# Patient Record
Sex: MALE | Race: WHITE | Employment: OTHER | URBAN - METROPOLITAN AREA
[De-identification: names, ages, dates, MRNs, and addresses within clinical notes are randomized per-mention and may not be internally consistent; named-entity substitution may affect disease eponyms.]

---

## 2017-10-24 RX ORDER — CALCIUM CARBONATE 500(1250)
1 TABLET ORAL DAILY
COMMUNITY

## 2017-10-24 RX ORDER — SODIUM PHOSPHATE,MONO-DIBASIC 19G-7G/118
1 ENEMA (ML) RECTAL DAILY
COMMUNITY

## 2017-10-24 RX ORDER — FLUTICASONE PROPIONATE 50 MCG
2 SPRAY, SUSPENSION (ML) NASAL DAILY
COMMUNITY

## 2017-10-25 ENCOUNTER — ANESTHESIA (OUTPATIENT)
Dept: SURGERY | Facility: CLINIC | Age: 82
End: 2017-10-25
Payer: MEDICARE

## 2017-10-25 ENCOUNTER — HOSPITAL ENCOUNTER (OUTPATIENT)
Facility: CLINIC | Age: 82
Discharge: HOME | End: 2017-10-25
Attending: OPHTHALMOLOGY | Admitting: OPHTHALMOLOGY
Payer: MEDICARE

## 2017-10-25 ENCOUNTER — ANESTHESIA EVENT (OUTPATIENT)
Dept: SURGERY | Facility: CLINIC | Age: 82
End: 2017-10-25
Payer: MEDICARE

## 2017-10-25 ENCOUNTER — SURGERY (OUTPATIENT)
Age: 82
End: 2017-10-25

## 2017-10-25 VITALS
SYSTOLIC BLOOD PRESSURE: 104 MMHG | HEIGHT: 74 IN | WEIGHT: 186 LBS | BODY MASS INDEX: 23.87 KG/M2 | DIASTOLIC BLOOD PRESSURE: 79 MMHG | HEART RATE: 79 BPM | RESPIRATION RATE: 16 BRPM | OXYGEN SATURATION: 94 % | TEMPERATURE: 97 F

## 2017-10-25 PROCEDURE — 25000128 H RX IP 250 OP 636: Performed by: OPHTHALMOLOGY

## 2017-10-25 PROCEDURE — 36000101 ZZH EYE SURGERY LEVEL 3 1ST 30 MIN: Performed by: OPHTHALMOLOGY

## 2017-10-25 PROCEDURE — 27210794 ZZH OR GENERAL SUPPLY STERILE: Performed by: OPHTHALMOLOGY

## 2017-10-25 PROCEDURE — 71000028 ZZH EYE RECOVERY PHASE 2 EACH 15 MINS: Performed by: OPHTHALMOLOGY

## 2017-10-25 PROCEDURE — 25000125 ZZHC RX 250: Performed by: OPHTHALMOLOGY

## 2017-10-25 PROCEDURE — 25000128 H RX IP 250 OP 636: Performed by: NURSE ANESTHETIST, CERTIFIED REGISTERED

## 2017-10-25 PROCEDURE — 25000128 H RX IP 250 OP 636: Performed by: ANESTHESIOLOGY

## 2017-10-25 PROCEDURE — 37000009 ZZH ANESTHESIA TECHNICAL FEE, EACH ADDTL 15 MIN: Performed by: OPHTHALMOLOGY

## 2017-10-25 PROCEDURE — 25000125 ZZHC RX 250: Performed by: NURSE ANESTHETIST, CERTIFIED REGISTERED

## 2017-10-25 PROCEDURE — 36000102 ZZH EYE SURGERY LEVEL 3 EA 15 ADDTL MIN: Performed by: OPHTHALMOLOGY

## 2017-10-25 PROCEDURE — 25000125 ZZHC RX 250: Performed by: ANESTHESIOLOGY

## 2017-10-25 PROCEDURE — 37000008 ZZH ANESTHESIA TECHNICAL FEE, 1ST 30 MIN: Performed by: OPHTHALMOLOGY

## 2017-10-25 PROCEDURE — 40000170 ZZH STATISTIC PRE-PROCEDURE ASSESSMENT II: Performed by: OPHTHALMOLOGY

## 2017-10-25 RX ORDER — SODIUM CHLORIDE, SODIUM LACTATE, POTASSIUM CHLORIDE, CALCIUM CHLORIDE 600; 310; 30; 20 MG/100ML; MG/100ML; MG/100ML; MG/100ML
INJECTION, SOLUTION INTRAVENOUS CONTINUOUS
Status: DISCONTINUED | OUTPATIENT
Start: 2017-10-25 | End: 2017-10-25 | Stop reason: HOSPADM

## 2017-10-25 RX ORDER — ERYTHROMYCIN 5 MG/G
OINTMENT OPHTHALMIC PRN
Status: DISCONTINUED | OUTPATIENT
Start: 2017-10-25 | End: 2017-10-25 | Stop reason: HOSPADM

## 2017-10-25 RX ORDER — ONDANSETRON 2 MG/ML
INJECTION INTRAMUSCULAR; INTRAVENOUS PRN
Status: DISCONTINUED | OUTPATIENT
Start: 2017-10-25 | End: 2017-10-25

## 2017-10-25 RX ORDER — TETRACAINE HYDROCHLORIDE 5 MG/ML
SOLUTION OPHTHALMIC PRN
Status: DISCONTINUED | OUTPATIENT
Start: 2017-10-25 | End: 2017-10-25 | Stop reason: HOSPADM

## 2017-10-25 RX ORDER — PROPOFOL 10 MG/ML
INJECTION, EMULSION INTRAVENOUS PRN
Status: DISCONTINUED | OUTPATIENT
Start: 2017-10-25 | End: 2017-10-25

## 2017-10-25 RX ADMIN — LIDOCAINE HYDROCHLORIDE 1 ML: 10 INJECTION, SOLUTION EPIDURAL; INFILTRATION; INTRACAUDAL; PERINEURAL at 11:31

## 2017-10-25 RX ADMIN — PROPOFOL 10 MG: 10 INJECTION, EMULSION INTRAVENOUS at 12:26

## 2017-10-25 RX ADMIN — SODIUM CHLORIDE, POTASSIUM CHLORIDE, SODIUM LACTATE AND CALCIUM CHLORIDE: 600; 310; 30; 20 INJECTION, SOLUTION INTRAVENOUS at 11:31

## 2017-10-25 RX ADMIN — PROPOFOL 10 MG: 10 INJECTION, EMULSION INTRAVENOUS at 12:27

## 2017-10-25 RX ADMIN — LIDOCAINE HYDROCHLORIDE,EPINEPHRINE BITARTRATE 5 ML: 20; .01 INJECTION, SOLUTION INFILTRATION; PERINEURAL at 12:28

## 2017-10-25 RX ADMIN — ERYTHROMYCIN 1 G: 5 OINTMENT OPHTHALMIC at 12:41

## 2017-10-25 RX ADMIN — PHENYLEPHRINE HYDROCHLORIDE 100 MCG: 10 INJECTION INTRAVENOUS at 12:43

## 2017-10-25 RX ADMIN — ONDANSETRON 4 MG: 2 INJECTION INTRAMUSCULAR; INTRAVENOUS at 12:30

## 2017-10-25 RX ADMIN — PHENYLEPHRINE HYDROCHLORIDE 100 MCG: 10 INJECTION INTRAVENOUS at 12:46

## 2017-10-25 RX ADMIN — PROPOFOL 30 MG: 10 INJECTION, EMULSION INTRAVENOUS at 12:25

## 2017-10-25 RX ADMIN — DEXMEDETOMIDINE HYDROCHLORIDE 12 MCG: 100 INJECTION, SOLUTION INTRAVENOUS at 12:21

## 2017-10-25 RX ADMIN — TETRACAINE HYDROCHLORIDE 1 DROP: 5 SOLUTION OPHTHALMIC at 12:23

## 2017-10-25 ASSESSMENT — ENCOUNTER SYMPTOMS: DYSRHYTHMIAS: 1

## 2017-10-25 NOTE — IP AVS SNAPSHOT
[facility]    [address]    [place] [address]    Phone:  [phone]    Fax:  [phone]                                       After Visit Summary   10/25/2017    Donavon Jenkins    MRN: [id number]           After Visit Summary Signature Page     I have received my discharge instructions, and my questions have been answered. I have discussed any challenges I see with this plan with the nurse or doctor.    ..........................................................................................................................................  Patient/Patient Representative Signature      ..........................................................................................................................................  Patient Representative Print Name and Relationship to Patient    ..................................................               ................................................  Date                                            Time    ..........................................................................................................................................  Reviewed by Signature/Title    ...................................................              ..............................................  Date                                                            Time

## 2017-10-25 NOTE — BRIEF OP NOTE
[facility] Brief Operative Note    Pre-operative diagnosis: ICD H02.834;H02.831; H02.413; H02.423   Post-operative diagnosis * No post-op diagnosis entered *  Same   Procedure: Procedure(s):  BILATERAL UPPER LID BLEPHAROPLASTY; BILATERAL UPPER LID PTOSIS REPAIR; BILATERAL BROW PEXY  - Wound Class: I-Clean   - Wound Class: I-Clean   Surgeon(s): Surgeon(s) and Role:     * Joshua Carpenter MD - Primary   Estimated blood loss: 2 mL    Specimens: * No specimens in log *   Findings: See dictation

## 2017-10-25 NOTE — IP AVS SNAPSHOT
MRN:[id number]                      After Visit Summary   10/25/2017    Donavon Jenkins    MRN: [id number]           Thank you!     Thank you for choosing [place] for your care. Our goal is always to provide you with excellent care. Hearing back from our patients is one way we can continue to improve our services. Please take a few minutes to complete the written survey that you may receive in the mail after you visit with us. Thank you!        Patient Information     Date Of Birth          6/6/1933        About your hospital stay     You were admitted on:  October 25, 2017 You last received care in the:  [facility] [place]    You were discharged on:  October 25, 2017       Who to Call     For medical emergencies, please call 911.  For non-urgent questions about your medical care, please call your primary care provider or clinic, [phone]  For questions related to your surgery, please call your surgery clinic        Attending Provider     Provider Specialty    Joshua Carpenter MD Ophthalmology       Primary Care Provider Office Phone # Fax #    Gonzalez Harrell [phone]      After Care Instructions     Eye drops as prescribed by physician.  Start drops today unless told otherwise by the physician           May use Tylenol or Advil for pain as directed by the physician           Notify Physician if you have severe headache or nausea           Remove patch per physician instruction           Return to clinic as instructed by physician           Wear eye shield or patch as directed                 Further instructions from your care team       [facility] Discharge  Instructions  Anesthesia (Eye Care Center)   Adult Discharge Instructions    For 24 hours after surgery    1. Get plenty of rest.  Make arrangements to have a responsible adult stay with you for at least 6 hours after you leave the hospital.  2. Do not drive or use heavy  equipment for 24 hours.    3. Do not drink alcohol for 24 hours.  4. Do not sign legal documents or make important decisions for 24 hours.  5. Avoid strenuous or risky activities. You may feel lightheaded.  If so, sit for a few minutes before standing.  Have someone help you get up.   6. Conscious sedation patients may resume a regular diet..  7. Any questions of medical nature, call your physician.      POST-OPERATIVE CARE FOLLOWING OPHTHALMIC PLASTIC SURGERY AND DCR  DR. WILLIAM PRIEST        ICE PACKS:                                                                                                             Have plenty of ice on hand at home for keeping ice packs over the surgical area. You can make an ice pack by putting some ice in a zip-lock baggie, and wrapping that with a clean washcloth. Apply ice as much as you can for 48 hours after surgery taking breaks as needed. Use you ice packs while in a reclined position, with two pillows under you head. The ice packs are very important, as cold helps reduce swelling and bruising. Sleeping with two pillows under your head the night of surgery may also minimize swelling.  Start hot packs 2 days after surgery.      BANDAGES AND OINTMENTS:                                                                         Please leave your bandages in place for 24 hours unless otherwise instructed. After removing the bandages, apply the ERYTHROMYCIN OINTMENT to the surgical area (along the sutures if present) FOUR TIMES DAILY FOR 1 WEEK. If you get ointment in your eye, it will blur your vision slightly, but will not harm your eye.      ACTIVITIES:                                                                                                             Strenuous activity should be avoided for the first week after surgery. For pain or discomfort, use TYLENOL as necessary following the directions on the bottle. Please DO NOT USE ANY ASPRIN PRODUCTS. In most cases you may shower or  "bathe the day following surgery. Avoid getting the operative area excessively wet; gently \"dab dry\", DO NOT RUB.      COMPLICATIONS:             Excessive pain, bleeding, or loss of vision is rare. Please notify us if any of these symptoms occur, we have an on-call service after hours, just call our main office number [phone].      Pending Results     No orders found from 10/23/2017 to 10/26/2017.            Admission Information     Date & Time Provider Department Dept. Phone    10/25/2017 Joshua Carpenter MD [facility] [place] [facility] [phone]      Your Vitals Were     Blood Pressure Pulse Temperature Respirations Height Weight    85/64 79 97  F (36.1  C) (Temporal) 16 1.88 m (6' 2\") 84.4 kg (186 lb)    Pulse Oximetry BMI (Body Mass Index)                95% 23.88 kg/m2          MyChart Information     [facility] lets you send messages to your doctor, view your test results, renew your prescriptions, schedule appointments and more. To sign up, go to [email or web address] . Click on \"Log in\" on the left side of the screen, which will take you to the Welcome page. Then click on \"Sign up Now\" on the right side of the page.     You will be asked to enter the access code listed below, as well as some personal information. Please follow the directions to create your username and password.     Your access code is: [email or web address]  Expires: 2018  1:10 PM     Your access code will  in 90 days. If you need help or a new code, please call your [place] clinic or [phone].        Care EveryWhere ID     This is your Care EveryWhere ID. This could be used by other organizations to access your [place] medical records  [id number]        Equal Access to Services     BARBIE DOMINGUEZ : Manish Romero, marcin wu, qashawn kaalgiancarlo abreu. So [facility] [phone].    ATENCIÓN: Si habla español, tiene a srinivasan disposición servicios gratuitos de " asistencia lingüística. Efren al [phone].    We comply with applicable federal civil rights laws and [place] laws. We do not discriminate on the basis of race, color, national origin, age, disability, sex, sexual orientation, or gender identity.               Review of your medicines      UNREVIEWED medicines. Ask your doctor about these medicines        Dose / Directions    budesonide 3 MG 24 hr capsule   Commonly known as:  ENTOCORT EC        Dose:  9 mg   Take 9 mg by mouth every morning   Refills:  0       calcium carbonate 1250 MG tablet   Commonly known as:  OS-AISHA 500 mg [place]. [place]        Dose:  1 tablet   Take 1 tablet by mouth daily   Refills:  0       fluticasone 50 MCG/ACT spray   Commonly known as:  FLONASE        Dose:  2 spray   Spray 2 sprays into both nostrils daily   Refills:  0       GLUCOSAMINE RELIEF PO        Take  by mouth.   Refills:  0       glucosamine-chondroitin 500-400 MG Caps per capsule        Dose:  1 capsule   Take 1 capsule by mouth daily   Refills:  0       IBUPROFEN PO        Refills:  0       metoprolol 25 MG tablet   Commonly known as:  LOPRESSOR   Used for:  Atrial fibrillation (H)        Dose:  25 mg   Take 1 tablet (25 mg) by mouth 2 times daily   Quantity:  60 tablet   Refills:  0       NITROSTAT SL        Dose:  0.4 mg   Place 0.4 mg under the tongue every 5 minutes as needed for chest pain   Refills:  0       OMEGA-3 FISH OIL PO        Refills:  0       oxyCODONE 5 MG IR tablet   Commonly known as:  ROXICODONE   Used for:  Spinal stenosis        Dose:  5-10 mg   Take 1-2 tablets (5-10 mg) by mouth every 3 hours as needed for moderate to severe pain   Quantity:  40 tablet   Refills:  0       polyethylene glycol Packet   Commonly known as:  MIRALAX/GLYCOLAX   Used for:  Constipation        Dose:  17 g   Take 17 g by mouth daily as needed for constipation (constipation)   Quantity:  7 packet   Refills:  0       PRADAXA PO        Dose:  150 mg   Take 150 mg by mouth 2  times daily   Refills:  0       PROPAFENONE HCL PO        Dose:  225 mg   Take 225 mg by mouth 2 times daily   Refills:  0       SIMVASTATIN PO        Dose:  20 mg   Take 20 mg by mouth At Bedtime   Refills:  0       VITAMIN C PO        Dose:  1000 mg   Take 1,000 mg by mouth 2 times daily   Refills:  0       VITAMIN D (CHOLECALCIFEROL) PO        Dose:  800 Units   Take 800 Units by mouth daily   Refills:  0       VITAMIN E NATURAL PO        Dose:  200 Units   Take 200 Units by mouth daily   Refills:  0       warfarin 5 MG tablet   Commonly known as:  COUMADIN   Used for:  Atrial fibrillation (H)        Dose:  5 mg   Take 1 tablet (5 mg) by mouth daily   Quantity:  30 tablet   Refills:  0                Protect others around you: Learn how to safely use, store and throw away your medicines at www.disposemymeds.org.             Medication List: This is a list of all your medications and when to take them. Check marks below indicate your daily home schedule. Keep this list as a reference.      Medications           Morning Afternoon Evening Bedtime As Needed    budesonide 3 MG 24 hr capsule   Commonly known as:  ENTOCORT EC   Take 9 mg by mouth every morning                                calcium carbonate 1250 MG tablet   Commonly known as:  OS-AISHA 500 mg [place]. Ca   Take 1 tablet by mouth daily                                fluticasone 50 MCG/ACT spray   Commonly known as:  FLONASE   Spray 2 sprays into both nostrils daily                                GLUCOSAMINE RELIEF PO   Take  by mouth.                                glucosamine-chondroitin 500-400 MG Caps per capsule   Take 1 capsule by mouth daily                                IBUPROFEN PO                                metoprolol 25 MG tablet   Commonly known as:  LOPRESSOR   Take 1 tablet (25 mg) by mouth 2 times daily                                NITROSTAT SL   Place 0.4 mg under the tongue every 5 minutes as needed for chest pain                                 OMEGA-3 FISH OIL PO                                oxyCODONE 5 MG IR tablet   Commonly known as:  ROXICODONE   Take 1-2 tablets (5-10 mg) by mouth every 3 hours as needed for moderate to severe pain                                polyethylene glycol Packet   Commonly known as:  MIRALAX/GLYCOLAX   Take 17 g by mouth daily as needed for constipation (constipation)                                PRADAXA PO   Take 150 mg by mouth 2 times daily                                PROPAFENONE HCL PO   Take 225 mg by mouth 2 times daily                                SIMVASTATIN PO   Take 20 mg by mouth At Bedtime                                VITAMIN C PO   Take 1,000 mg by mouth 2 times daily                                VITAMIN D (CHOLECALCIFEROL) PO   Take 800 Units by mouth daily                                VITAMIN E NATURAL PO   Take 200 Units by mouth daily                                warfarin 5 MG tablet   Commonly known as:  COUMADIN   Take 1 tablet (5 mg) by mouth daily

## 2017-10-25 NOTE — OP NOTE
Pre-op Diagnosis:  1.  Bilateral upper lid dermatochalasis 2. Bilateral upper lid ptosis 3. Bilateral brow ptosis 4. Bilateral superior visual field defect    Post-operative Diagnosis:  Same    Procedure:  1.  Bilateral upper lid blepharoplasty  2.  Bilateral upper lid ptosis repair 3.  Bilateral browpexy    Surgeon:  Joshua Carpenter MD    Anesthesia:  MAC, 2% Lidocaine with 1:100,000 epinephrine and sodium bicarbonate    Complications:  None    EBL:  5cc    Indications for surgery:  The patient presented to the office with a history of decreased superior visual field secondary to dermatochalasis, ptosis, and brow ptosis, documented with measurements, photos, and visual field testing.  We discussed the risks, benefits, and alternatives of the aforementioned procedures and the patient wished to proceed.      Procedure:  The upper eyelid creases were marked using a skin marking pen.  A blepharoplasty incision was marked out on each upper eyelid allowing for adequate skin removal.  Both upper lids were infiltrated with the local anesthetic.  Beginning on the left side, the skin was incised using a scalpel, and then a skin-muscle flap was removed using scissors.  The orbital septum was opened and a small amount of preaponeurotic fat was removed and sculpted for better lid contour.  The levator aponeurosis was identified and disinserted from the anterior surface of the tarsus.  The high temperature cautery was used to dissect the retro orbicularis oculi fat pad from underneath the brow until good brow motility was noted.  An identical dissection was carried out on the right side.  A 5-0 double armed mersilene was used to plicate the levator to the anterior surface of the tarsus on each side.  The patient was asked to open the eyes and small adjustments were made until good lid contour and height were achieved on each side.  The lid position was noted to be symmetric at the end of making adjustments.  A 4-0 prolene suture  was used to plicate the brow internally to the frontalis periosteum.  Good brow height and symmetry were noted on each side.  A 6-0 fast absorbing plain gut suture was used to reform the crease by capturing the skin, levator, and skin again.  Symmetry was achieved on each side.  The incisions were closed using 6-0 fast absorbing gut suture.  Erythromycin ointment was placed on the incisions, followed by a bandage.  All hemostasis was achieved with the high temperature cautery and the patient tolerated the procedure well.

## 2017-10-25 NOTE — ANESTHESIA PREPROCEDURE EVALUATION
Anesthesia Evaluation     . Pt has had prior anesthetic.     No history of anesthetic complications          ROS/MED HX    ENT/Pulmonary:     (+)sleep apnea, uses CPAP , . .    Neurologic:       Cardiovascular:     (+) Dyslipidemia, hypertension----. : . . . :. dysrhythmias a-fib, .       METS/Exercise Tolerance:     Hematologic:         Musculoskeletal:         GI/Hepatic:        (-) GERD and liver disease   Renal/Genitourinary:     (+) chronic renal disease, type: CRI,       Endo:     (+) type II DM .      Psychiatric:         Infectious Disease:         Malignancy:         Other:                     Physical Exam  Normal systems: cardiovascular and pulmonary    Airway   Mallampati: II  TM distance: >3 FB  Neck ROM: full    Dental   (+) partials    Cardiovascular       Pulmonary                     Anesthesia Plan      History & Physical Review  History and physical reviewed and following examination; no interval change.    ASA Status:  3 .    NPO Status:  > 8 hours    Plan for MAC Reason for MAC:  Procedure to face, neck, head or breast         Postoperative Care      Consents  Anesthetic plan, risks, benefits and alternatives discussed with:  Patient..        Procedure: Procedure(s):  COMBINED BLEPHAROPLASTY, BROW LIFT BILATERAL  REPAIR PTOSIS BILATERAL  Preop diagnosis: ICD H02.834;H02.831; H02.413; H02.423    No Known Allergies  Past Medical History:   Diagnosis Date     Air conduction deafness      Atrial fibrillation (H)      Back pain      BPH (benign prostatic hypertrophy)      CKD (chronic kidney disease), stage III      Hypertension      Lymphocytic colitis      Noninfectious ileitis     colitis/chronic diarrhea     Other and unspecified hyperlipidemia      Repeated falls     fell twice in past 5 days/skin tears rt hand+ arm     Sleep apnea     uses CPAP     Past Surgical History:   Procedure Laterality Date     carotid enderactomy[       CATARACT IOL, RT/LT       COLONOSCOPY  2/8/2012     Procedure:COLONOSCOPY; COLONOSCOPY ; Surgeon:KRIS VALLEJO; Location: GI     [facility] OR OCULAR DEVICE INTRAOP DETACHED RETINA      right eye     LAMINECTOMY LUMBAR ONE LEVEL  7/14/2014    Procedure: LAMINECTOMY LUMBAR ONE LEVEL;  Surgeon: Pop Rogel MD;  Location:  OR     ORTHOPEDIC SURGERY      lower back     Prior to Admission medications    Medication Sig Start Date End Date Taking? Authorizing Provider   Dabigatran Etexilate Mesylate (PRADAXA PO) Take 150 mg by mouth 2 times daily   Yes Reported, Patient   fluticasone (FLONASE) 50 MCG/ACT spray Spray 2 sprays into both nostrils daily   Yes Reported, Patient   calcium carbonate (OS-AISHA 500 MG [place]. [place]) 1250 MG tablet Take 1 tablet by mouth daily   Yes Reported, Patient   Ascorbic Acid (VITAMIN C PO) Take 1,000 mg by mouth 2 times daily   Yes Reported, Patient   VITAMIN D, CHOLECALCIFEROL, PO Take 800 Units by mouth daily   Yes Reported, Patient   VITAMIN E NATURAL PO Take 200 Units by mouth daily   Yes Reported, Patient   glucosamine-chondroitin 500-400 MG CAPS per capsule Take 1 capsule by mouth daily   Yes Reported, Patient   metoprolol (LOPRESSOR) 25 MG tablet Take 1 tablet (25 mg) by mouth 2 times daily 7/17/14  Yes Papa Echeverria MD   Omega-3 Fatty Acids (OMEGA-3 FISH OIL PO)    Yes Reported, Patient   IBUPROFEN PO    Yes Reported, Patient   SIMVASTATIN PO Take 20 mg by mouth At Bedtime    Yes Reported, Patient   Glucosamine Sulfate (GLUCOSAMINE RELIEF PO) Take  by mouth.   Yes Reported, Patient   warfarin (COUMADIN) 5 MG tablet Take 1 tablet (5 mg) by mouth daily 7/23/14   Hillary Villavicencio APRN CNP   polyethylene glycol (MIRALAX/GLYCOLAX) packet Take 17 g by mouth daily as needed for constipation (constipation) 7/17/14   Papa Echeverria MD   oxyCODONE (ROXICODONE) 5 MG immediate release tablet Take 1-2 tablets (5-10 mg) by mouth every 3 hours as needed for moderate to severe pain 7/17/14   Cheri Lomeli, PAGiselleC   budesonide (ENTOCORT EC) 3 MG 24 hr  capsule Take 9 mg by mouth every morning    Reported, Patient   Nitroglycerin (NITROSTAT SL) Place 0.4 mg under the tongue every 5 minutes as needed for chest pain    Reported, Patient   PROPAFENONE HCL PO Take 225 mg by mouth 2 times daily     Reported, Patient     Current Facility-Administered Medications Ordered in Epic   Medication Dose Route Frequency Last Rate Last Dose     lidocaine 1 % 1 mL  1 mL Other Q1H PRN   1 mL at 10/25/17 1131     lactated ringers infusion   Intravenous Continuous 25 mL/hr at 10/25/17 1131       No current [facility]-ordered outpatient prescriptions on file.     Wt Readings from Last 1 Encounters:   10/25/17 84.4 kg (186 lb)     Temp Readings from Last 1 Encounters:   10/25/17 36.1  C (97  F) (Temporal)     BP Readings from Last 6 Encounters:   10/25/17 125/89   07/18/14 140/80   07/17/14 115/73   02/08/12 128/74     Pulse Readings from Last 4 Encounters:   10/25/17 79   07/18/14 78     Resp Readings from Last 1 Encounters:   10/25/17 18     SpO2 Readings from Last 1 Encounters:   10/25/17 97%     Recent Labs   Lab Test  07/17/14   0800  07/16/14   1815   NA  128*  126*   POTASSIUM  4.7  4.9   CHLORIDE  93*  89*   CO2  29  29   ANIONGAP  6  8   GLC  104*  108*   BUN  15  15   CR  0.88  0.86   AISHA  8.6  8.7     Recent Labs   Lab Test  07/17/14   0800  07/16/14   1309   WBC  7.2  11.2*   HGB  12.7*  13.3   PLT  230  238     Recent Labs   Lab Test  07/16/14   1309  07/14/14   0840   INR  1.11  1.07      RECENT LABS:   ECG:   ECHO:   CXR:                      .

## 2017-10-25 NOTE — DISCHARGE INSTRUCTIONS
[facility] Discharge  Instructions  Anesthesia ([facility] Care Center)   Adult Discharge Instructions    For 24 hours after surgery    1. Get plenty of rest.  Make arrangements to have a responsible adult stay with you for at least 6 hours after you leave the hospital.  2. Do not drive or use heavy equipment for 24 hours.    3. Do not drink alcohol for 24 hours.  4. Do not sign legal documents or make important decisions for 24 hours.  5. Avoid strenuous or risky activities. You may feel lightheaded.  If so, sit for a few minutes before standing.  Have someone help you get up.   6. Conscious sedation patients may resume a regular diet..  7. Any questions of medical nature, call your physician.      POST-OPERATIVE CARE FOLLOWING OPHTHALMIC PLASTIC SURGERY AND DCR  DR. WILLIAM PRIEST        ICE PACKS:                                                                                                             Have plenty of ice on hand at home for keeping ice packs over the surgical area. You can make an ice pack by putting some ice in a zip-lock baggie, and wrapping that with a clean washcloth. Apply ice as much as you can for 48 hours after surgery taking breaks as needed. Use you ice packs while in a reclined position, with two pillows under you head. The ice packs are very important, as cold helps reduce swelling and bruising. Sleeping with two pillows under your head the night of surgery may also minimize swelling.  Start hot packs 2 days after surgery.      BANDAGES AND OINTMENTS:                                                                         Please leave your bandages in place for 24 hours unless otherwise instructed. After removing the bandages, apply the ERYTHROMYCIN OINTMENT to the surgical area (along the sutures if present) FOUR TIMES DAILY FOR 1 WEEK. If you get ointment in your eye, it will blur your vision slightly, but will not harm your eye.      ACTIVITIES:             "                                                                                                 Strenuous activity should be avoided for the first week after surgery. For pain or discomfort, use TYLENOL as necessary following the directions on the bottle. Please DO NOT USE ANY ASPRIN PRODUCTS. In most cases you may shower or bathe the day following surgery. Avoid getting the operative area excessively wet; gently \"dab dry\", DO NOT RUB.      COMPLICATIONS:             Excessive pain, bleeding, or loss of vision is rare. Please notify us if any of these symptoms occur, we have an on-call service after hours, just call our main office number [phone].    "

## 2017-10-25 NOTE — ANESTHESIA CARE TRANSFER NOTE
Patient: Donavon Jenkins    Procedure(s):  BILATERAL UPPER LID BLEPHAROPLASTY; BILATERAL UPPER LID PTOSIS REPAIR; BILATERAL BROW PEXY  - Wound Class: I-Clean   - Wound Class: I-Clean    Diagnosis: ICD H02.834;H02.831; H02.413; H02.423  Diagnosis Additional Information: No value filed.    Anesthesia Type:   MAC     Note:  Airway :Room Air  Patient transferred to:PACU  Comments: Awake, alert, VSS, report to RN, monitors and alarms on, IV patent.  Handoff Report: Identifed the Patient, Identified the Reponsible Provider, Reviewed the pertinent medical history, Discussed the surgical course, Reviewed Intra-OP anesthesia mangement and issues during anesthesia, Set expectations for post-procedure period and Allowed opportunity for questions and acknowledgement of understanding      Vitals: (Last set prior to Anesthesia Care Transfer)    CRNA VITALS  10/25/2017 1227 - 10/25/2017 1303      10/25/2017             Pulse: 71    Ht Rate: 73    SpO2: 94 %    Resp Rate (set): 10                Electronically Signed By: MALLORY Moore CRNA  October 25, 2017  1:03 PM

## 2017-10-25 NOTE — ANESTHESIA POSTPROCEDURE EVALUATION
Patient: Donaovn Jenkins    Procedure(s):  BILATERAL UPPER LID BLEPHAROPLASTY; BILATERAL UPPER LID PTOSIS REPAIR; BILATERAL BROW PEXY  - Wound Class: I-Clean   - Wound Class: I-Clean    Diagnosis:ICD H02.834;H02.831; H02.413; H02.423  Diagnosis Additional Information: No value filed.    Anesthesia Type:  MAC    Note:  Anesthesia Post Evaluation    Patient location during evaluation: PACU  Patient participation: Able to fully participate in evaluation  Level of consciousness: awake and alert  Pain management: satisfactory to patient  Airway patency: patent  Cardiovascular status: hemodynamically stable  Respiratory status: acceptable and unassisted  Hydration status: balanced  PONV: none     Anesthetic complications: None          Last vitals:  Vitals:    10/25/17 1127 10/25/17 1306 10/25/17 1315   BP: 125/89 (!) 85/64 104/79   Pulse: 79     Resp: 18 16 16   Temp: 36.1  C (97  F)     SpO2: 97% 95% 94%         Electronically Signed By: Tyron Momin MD  October 25, 2017  1:58 PM

## 2017-10-25 NOTE — OR NURSING
Post op instructions reviewed with pt and responsible adult.  VSS, denies pain.  D/C'd to home with an ice pack.

## 2021-01-21 ENCOUNTER — HOSPITAL LABORATORY (OUTPATIENT)
Dept: OTHER | Facility: CLINIC | Age: 86
End: 2021-01-21

## 2021-01-21 LAB
ANION GAP SERPL CALCULATED.3IONS-SCNC: 3 MMOL/L (ref 3–14)
BUN SERPL-MCNC: 30 MG/DL (ref 7–30)
CALCIUM SERPL-MCNC: 9 MG/DL (ref 8.5–10.1)
CHLORIDE SERPL-SCNC: 101 MMOL/L (ref 94–109)
CO2 SERPL-SCNC: 31 MMOL/L (ref 20–32)
CREAT SERPL-MCNC: 1.17 MG/DL (ref 0.66–1.25)
GFR SERPL CREATININE-BSD FRML MDRD: 55 ML/MIN/{1.73_M2}
GLUCOSE SERPL-MCNC: 106 MG/DL (ref 70–99)
POTASSIUM SERPL-SCNC: 4.6 MMOL/L (ref 3.4–5.3)
SODIUM SERPL-SCNC: 135 MMOL/L (ref 133–144)

## 2024-01-29 ENCOUNTER — HOSPITAL ENCOUNTER (OUTPATIENT)
Facility: CLINIC | Age: 89
End: 2024-01-29
Attending: OPHTHALMOLOGY | Admitting: OPHTHALMOLOGY
Payer: MEDICARE

## (undated) DEVICE — LINEN TOWEL PACK X5 5464

## (undated) DEVICE — SU PLAIN FAST ABSORB 6-0 PC-1 18" 1916G

## (undated) DEVICE — SU MERSILENE 5-0 S-24 18" 1764G

## (undated) DEVICE — PACK OCULOPLATIC SEN15OCFSD

## (undated) DEVICE — SU PROLENE 4-0 V-7DA 36" 8975H

## (undated) DEVICE — GLOVE PROTEXIS W/NEU-THERA 7.5  2D73TE75

## (undated) DEVICE — EYE DRSG PAD OVAL

## (undated) DEVICE — ESU EYE HIGH TEMP 65410-183

## (undated) DEVICE — GLOVE PROTEXIS MICRO 6.5  2D73PM65

## (undated) DEVICE — NDL 18GA 1.5" 305196

## (undated) DEVICE — SOL WATER IRRIG 1000ML BOTTLE 2F7114

## (undated) DEVICE — SYR 03ML LL W/O NDL 309657

## (undated) RX ORDER — ERYTHROMYCIN 5 MG/G
OINTMENT OPHTHALMIC
Status: DISPENSED
Start: 2017-10-25

## (undated) RX ORDER — LIDOCAINE HYDROCHLORIDE AND EPINEPHRINE BITARTRATE 20; .01 MG/ML; MG/ML
INJECTION, SOLUTION SUBCUTANEOUS
Status: DISPENSED
Start: 2017-10-25

## (undated) RX ORDER — TETRACAINE HYDROCHLORIDE 5 MG/ML
SOLUTION OPHTHALMIC
Status: DISPENSED
Start: 2017-10-25